# Patient Record
Sex: FEMALE | Race: WHITE | Employment: FULL TIME | URBAN - METROPOLITAN AREA
[De-identification: names, ages, dates, MRNs, and addresses within clinical notes are randomized per-mention and may not be internally consistent; named-entity substitution may affect disease eponyms.]

---

## 2022-02-25 ENCOUNTER — HOSPITAL ENCOUNTER (EMERGENCY)
Facility: HOSPITAL | Age: 52
Discharge: LEFT BEFORE DISCHARGE | End: 2022-02-25
Payer: COMMERCIAL

## 2022-02-25 VITALS
RESPIRATION RATE: 18 BRPM | SYSTOLIC BLOOD PRESSURE: 120 MMHG | OXYGEN SATURATION: 96 % | TEMPERATURE: 99 F | DIASTOLIC BLOOD PRESSURE: 78 MMHG | HEART RATE: 86 BPM

## 2022-02-25 LAB
ATRIAL RATE: 63 BPM
P AXIS: 55 DEGREES
P-R INTERVAL: 128 MS
Q-T INTERVAL: 432 MS
QRS DURATION: 96 MS
QTC CALCULATION (BEZET): 442 MS
R AXIS: 17 DEGREES
T AXIS: 56 DEGREES
VENTRICULAR RATE: 63 BPM

## 2022-02-25 PROCEDURE — 93005 ELECTROCARDIOGRAM TRACING: CPT

## 2022-02-25 NOTE — ED INITIAL ASSESSMENT (HPI)
Pt to the ER from home d/t not feeling well and CP and SOB. Pt states she \"feels like something is sitting on my chest.\" with pain behind her left shoulder. Pt states she is dizzy. Pt presents to the ER a&ox4. Respirations even and unlabored. Skin warm and dry.

## 2025-06-06 ENCOUNTER — OFFICE VISIT (OUTPATIENT)
Dept: PODIATRY CLINIC | Facility: CLINIC | Age: 55
End: 2025-06-06

## 2025-06-06 VITALS — DIASTOLIC BLOOD PRESSURE: 68 MMHG | SYSTOLIC BLOOD PRESSURE: 134 MMHG

## 2025-06-06 DIAGNOSIS — M20.11 HALLUX VALGUS, RIGHT: ICD-10-CM

## 2025-06-06 DIAGNOSIS — G57.61 MORTON NEUROMA, RIGHT: Primary | ICD-10-CM

## 2025-06-06 DIAGNOSIS — S99.921A INJURY OF PLANTAR PLATE, RIGHT, INITIAL ENCOUNTER: ICD-10-CM

## 2025-06-06 DIAGNOSIS — M77.41 METATARSALGIA, RIGHT FOOT: ICD-10-CM

## 2025-06-06 DIAGNOSIS — M20.12 HALLUX VALGUS, LEFT: ICD-10-CM

## 2025-06-06 RX ORDER — TRIAMCINOLONE ACETONIDE 40 MG/ML
20 INJECTION, SUSPENSION INTRA-ARTICULAR; INTRAMUSCULAR ONCE
Status: COMPLETED | OUTPATIENT
Start: 2025-06-06 | End: 2025-06-06

## 2025-06-06 NOTE — PROGRESS NOTES
[facility] Podiatry  Progress Note    Annie Figueroa is a 54 year old female.   Chief Complaint   Patient presents with    New Patient     Bunions bilateral feet-pain 3/10 right foot is worse than the left         HPI:     Patient is a very pleasant 54-year-old female presents to clinic for evaluation of multiple pedal complaints.  She has bunions to bilateral feet that are starting to cause worsening pain.  Her right foot is more painful than her left.  She rates her pain at a 3 out of 10.  She has tried custom inserts, shoe gear modification, and anti-inflammatories.  She is very active and likes to walk a lot for exercise.  She  is wondering what treatment options are available.  Past medical history, medications, and allergies reviewed.      Allergies: Patient has no known allergies.   Current Medications[1]   Past Medical History[2]   Past Surgical History[3]   Family History[4]   Social Hx on file[5]        REVIEW OF SYSTEMS:     No n/v/f/c.      EXAM:   /68 (BP Location: Right arm, Patient Position: Sitting, Cuff Size: adult)   LMP  (LMP Unknown)   GENERAL: well developed, well nourished, in no apparent distress  EXTREMITIES:   1. Integument: Normal skin temperature and turgor  2. Vascular: Dorsalis pedis two out of four bilateral and posterior tibial pulses two out of   four bilateral, capillary refill normal.   3. Musculoskeletal: All muscle groups are graded 5 out of 5 in the foot and ankle.   4. Neurological: Normal sharp dull sensation; reflexes normal.    Right foot x-rays:    Medial deviation of first metatarsal and lateralization of hallux  consistent with moderate bunion deformity.  Positive Peres sign between  2nd and 3rd digits.  Lateral deviation of fifth metatarsal and medial  deviation of fifth toe consistent with tailor's bunion deformity.  No  acute fractures or osseous abnormalities noted.       Left foot x-rays:     Medial deviation of first metatarsal levitation of hallux  consistent with  moderate hallux valgus deformity.  Lateral patient fifth metatarsal medial  deviation of fifth toe consistent with tailor's bunion deformity.  No  acute fractures or osseous abnormalities noted.               ASSESSMENT AND PLAN:   Diagnoses and all orders for this visit:    Nima neuroma, right  -     triamcinolone acetonide (Kenalog-40) 40 MG/ML injection 20 mg    Hallux valgus, right  -     [facility]B POD XR - RT FOOT 3 VIEWS(AP,LAT,OBLIQUE)WT BEARING    Metatarsalgia, right foot    Injury of plantar plate, right, initial encounter    Hallux valgus, left  -     [facility] AMB POD XR - LT FOOT 3 VIEWS(AP,LAT,OBLIQUE) WT BEARING        Plan:    -Patient examined, chart history reviewed.  -Discussed etiology of condition and various treatment options.  - X-rays obtained and reviewed--moderate bunion deformity bilaterally with hammertoes and tailor's bunions as well.  Peres sign between 2nd and 3rd toes right foot.  - Discussed treatment options at length including conservative and surgical treatment options.  Patient has exhausted conservative treatment options including shoe gear modification, inserts, anti-inflammatories.  - Discussed with patient that we may consider surgical intervention as his bunion and foot pain seems to be interfering with her daily life.  - Surgically, would likely recommend Lapidus bunionectomy with tailor's bunionectomy and osteotomy as well as possible hammertoe correction/plantar plate repair of right second MPJ.  - Ultrasound reviewed from outside facility that did show possible plantar plate injury to her second MPJ.  - Patient also does have some neuroma symptoms to her right foot.  Discussed cortisone injection at this site including benefits and risks.  Discussed that this would be both diagnostic and therapeutic.  If she does have continued neuroma symptoms could consider surgical removal of this as well.  Patient agreeable and written consent was obtained.  - After  prepping site with alcohol, administer cortisone injection to right second interspace consisting of 0.5 cc of 0.5% Marcaine plain and 0.5 cc of Kenalog 40.  Patient tolerated injection well and there are no complications.  Site was dressed with Band-Aid.    Patient to continue ambulate with supportive shoes and avoid walking barefoot.  Will follow-up in 3 to 4 weeks to see how she did with foot injection and can solidify surgical plan at this time.    All questions answered to satisfaction.  Appreciate the opportunity participate in patient's care.    The patient indicates understanding of these issues and agrees to the plan.        Minh Mace DPM    Dragon speech recognition software was used to prepare this note.  Errors in word recognition may occur.  Please contact me with any questions/concerns with this note.         [1]   Current Outpatient Medications   Medication Sig Dispense Refill    ClonazePAM (KLONOPIN) 0.5 MG Oral Tab   0    Meclizine HCl (ANTIVERT) 12.5 MG Oral Tab Take 12.5 mg by mouth 3 (three) times daily as needed.      Multiple Vitamins-Minerals (MULTI-VITAMIN/MINERALS) Oral Tab Take 1 tablet by mouth daily.      Calcium Carbonate-Vitamin D (CALCIUM + D OR) Take by mouth.      Naproxen Sodium (ALEVE OR) Take by mouth.      CRANBERRY EXTRACT OR Take by mouth.     [2]   Past Medical History:   Allergic rhinitis    Cancer (HCC)   [3]   Past Surgical History:  Procedure Laterality Date    Sinus surgery        Tonsillectomy     [4]   Family History  Problem Relation Age of Onset    Allergies Mother     Cancer Mother     Cancer Maternal Grandmother     Cancer Maternal Grandfather     Ear Problems Neg     Bleeding Disorders Neg     Clotting Disorder Neg     Thyroid disease Neg     Hypertension Neg     Asthma Neg     Arthritis Neg     Diabetes Neg     Anesthesia Problems  Neg    [5]   Social History  Socioeconomic History    Marital status:    Tobacco Use    Smoking status: Never

## 2025-06-06 NOTE — PROGRESS NOTES
Per Dr. Mace draw up 0.5ml of 0.5% Marcaine and 0.5ml of Kenalog 40 for injection to right foot.

## 2025-06-30 ENCOUNTER — OFFICE VISIT (OUTPATIENT)
Dept: PODIATRY CLINIC | Facility: CLINIC | Age: 55
End: 2025-06-30

## 2025-06-30 DIAGNOSIS — S99.921A INJURY OF PLANTAR PLATE, RIGHT, INITIAL ENCOUNTER: ICD-10-CM

## 2025-06-30 DIAGNOSIS — M20.11 HALLUX VALGUS, RIGHT: Primary | ICD-10-CM

## 2025-06-30 DIAGNOSIS — G57.61 MORTON NEUROMA, RIGHT: ICD-10-CM

## 2025-06-30 DIAGNOSIS — M20.12 HALLUX VALGUS, LEFT: ICD-10-CM

## 2025-06-30 DIAGNOSIS — M77.41 METATARSALGIA, RIGHT FOOT: ICD-10-CM

## 2025-06-30 PROCEDURE — 99213 OFFICE O/P EST LOW 20 MIN: CPT | Performed by: STUDENT IN AN ORGANIZED HEALTH CARE EDUCATION/TRAINING PROGRAM

## 2025-06-30 NOTE — PROGRESS NOTES
[facility] Podiatry  Progress Note    Annie Figueroa is a 54 year old female.   No chief complaint on file.        HPI:     Patient is a very pleasant 54-year-old female presents to clinic for evaluation of multiple pedal complaints.  She has bunions to bilateral feet that are starting to cause worsening pain.  Her right foot is more painful than her left.  She rates her pain at a 3 out of 10.  She has tried custom inserts, shoe gear modification, and anti-inflammatories.  She is very active and likes to walk a lot for exercise.  She  is wondering what treatment options are available.  She did have cortisone injection to her right second interspace which helped her symptoms at last visit.  Past medical history, medications, and allergies reviewed.      Allergies: Patient has no known allergies.   Current Medications[1]   Past Medical History[2]   Past Surgical History[3]   Family History[4]   Social Hx on file[5]        REVIEW OF SYSTEMS:     No n/v/f/c.      EXAM:   LMP  (LMP Unknown)   GENERAL: well developed, well nourished, in no apparent distress  EXTREMITIES:   1. Integument: Normal skin temperature and turgor  2. Vascular: Dorsalis pedis two out of four bilateral and posterior tibial pulses two out of   four bilateral, capillary refill normal.   3. Musculoskeletal: All muscle groups are graded 5 out of 5 in the foot and ankle.  Medial deviation of first metatarsal and lateral deviation of hallux consistent with moderate bunion deformity.  Positive Peres sign between right 2nd and 3rd digits.  Pain noted to second interspace with palpable click.  Tailor's bunion deformity noted bilaterally.   4. Neurological: Normal sharp dull sensation; reflexes normal.    Right foot x-rays:    Medial deviation of first metatarsal and lateralization of hallux  consistent with moderate bunion deformity.  Positive Peres sign between  2nd and 3rd digits.  Lateral deviation of fifth metatarsal and medial  deviation  of fifth toe consistent with tailor's bunion deformity.  No  acute fractures or osseous abnormalities noted.       Left foot x-rays:     Medial deviation of first metatarsal levitation of hallux consistent with  moderate hallux valgus deformity.  Lateral patient fifth metatarsal medial  deviation of fifth toe consistent with tailor's bunion deformity.  No  acute fractures or osseous abnormalities noted.               ASSESSMENT AND PLAN:   Diagnoses and all orders for this visit:    Hallux valgus, right    Metatarsalgia, right foot    Herring neuroma, right    Hallux valgus, left    Injury of plantar plate, right, initial encounter          Plan:    -Patient examined, chart history reviewed.  -Discussed etiology of condition and various treatment options.  - Recent x-rays reviewed--moderate bunion deformity bilaterally with hammertoes and tailor's bunions as well.  Peres sign between 2nd and 3rd toes right foot.  - Discussed treatment options at length including conservative and surgical treatment options.  Patient has exhausted conservative treatment options including shoe gear modification, inserts, anti-inflammatories.  - Discussed with patient that we may consider surgical intervention as his bunion and foot pain seems to be interfering with her daily life.  - Surgically, would likely recommend Lapidus bunionectomy with tailor's bunionectomy and osteotomy as well as possible hammertoe correction/plantar plate repair of right second MPJ.  Would also consent patient for neuroma excision to second interspace as she did experience some improvement with cortisone injection at this site and does have some neuroma symptoms clinically.  - Ultrasound reviewed from outside facility that did show possible plantar plate injury to her second MPJ.    All questions answered to satisfaction.  Appreciate the opportunity participate in patient's care.    The patient indicates understanding of these issues and agrees to the  plan.      R would be lapidus, TB, 2nd met shortening, 2nd IS neuroma, and digits 2-4    L wound be the same but no neuroma    All treatment options have been discussed with the patient including both conservative and surgical attempts at correction. Potential risks and complications of surgical intervention were discussed at length which including but not not limited to death, loss of limb, post op pain, swelling, infection, bleeding, reoccurrence of the deformity, extended healing, and the possibility of further and future surgery.  No guarantees have been made to the patient.    Patient can reach out to schedule surgery at her convenience.  Discussed that pain and function should be guide when considering surgery.  Also discussed the importance of having proper time to recover from surgery.      Minh Mace DPM    Dragon speech recognition software was used to prepare this note.  Errors in word recognition may occur.  Please contact me with any questions/concerns with this note.           [1]   Current Outpatient Medications   Medication Sig Dispense Refill    ClonazePAM (KLONOPIN) 0.5 MG Oral Tab   0    Meclizine HCl (ANTIVERT) 12.5 MG Oral Tab Take 12.5 mg by mouth 3 (three) times daily as needed.      Multiple Vitamins-Minerals (MULTI-VITAMIN/MINERALS) Oral Tab Take 1 tablet by mouth daily.      Calcium Carbonate-Vitamin D (CALCIUM + D OR) Take by mouth.      Naproxen Sodium (ALEVE OR) Take by mouth.      CRANBERRY EXTRACT OR Take by mouth.     [2]   Past Medical History:   Allergic rhinitis    Cancer (HCC)   [3]   Past Surgical History:  Procedure Laterality Date    Sinus surgery        Tonsillectomy     [4]   Family History  Problem Relation Age of Onset    Allergies Mother     Cancer Mother     Cancer Maternal Grandmother     Cancer Maternal Grandfather     Ear Problems Neg     Bleeding Disorders Neg     Clotting Disorder Neg     Thyroid disease Neg     Hypertension Neg     Asthma Neg     Arthritis Neg      Diabetes Neg     Anesthesia Problems  Neg    [5]   Social History  Socioeconomic History    Marital status:    Tobacco Use    Smoking status: Never

## (undated) NOTE — LETTER
AUTHORIZATION FOR SURGICAL OPERATION OR OTHER PROCEDURE    1. I hereby authorize Dr. Minh Mace, and [facility] staff assigned to my case to perform the following operation and/or procedure at the [facility] site:    _______________________________________________________________________________________________    Cortisone Injection Right Foot  _______________________________________________________________________________________________    2.  My physician has explained the nature and purpose of the operation or other procedure, possible alternative methods of treatment, the risks involved, and the possibility of complication to me.  I acknowledge that no guarantee has been made as to the result that may be obtained.  3.  I recognize that, during the course of this operation, or other procedure, unforseen conditions may necessitate additional or different procedure than those listed above.  I, therefore, further authorize and request that the above named physician, his/her physician assistants or designees perform such procedures as are, in his/her professional opinion, necessary and desirable.  4.  Any tissue or organs removed in the operation or other procedure may be disposed of by and at the discretion of the [facility].  5.  I understand that in the event of a medical emergency, I will be transported by local paramedics to [facility] or other hospital emergency department.  6.  I certify that I have read and fully understand the above consent to operation and/or other procedure.    7.  I acknowledge that my physician has explained sedation/analgesia administration to me including the risks and benefits.  I consent to the administration of sedation/analgesia as may be necessary or desirable in the judgement of my physician.    Witness signature: ___________________________________________________ Date:   ______/______/_____                    Time:  ________ A.M.  P.M.       Patient Name:  ______________________________________________________  (please print)      Patient signature:  ___________________________________________________             Relationship to Patient:           []  Parent    Responsible person                          []  Spouse  In case of minor or                    [] Other  _____________   Incompetent name:  __________________________________________________                               (please print)      _____________      Responsible person  In case of minor or  Incompetent signature:  _______________________________________________    Statement of Physician  My signature below affirms that prior to the time of the procedure, I have explained to the patient and/or his/her guardian, the risks and benefits involved in the proposed treatment and any reasonable alternative to the proposed treatment.  I have also explained the risks and benefits involved in the refusal of the proposed treatment and have answered the patient's questions.                        Date:  ______/______/_______  Provider                      Signature:  __________________________________________________________       Time:  ___________ A.M    P.M.